# Patient Record
Sex: FEMALE | Race: BLACK OR AFRICAN AMERICAN | NOT HISPANIC OR LATINO | Employment: UNEMPLOYED | URBAN - METROPOLITAN AREA
[De-identification: names, ages, dates, MRNs, and addresses within clinical notes are randomized per-mention and may not be internally consistent; named-entity substitution may affect disease eponyms.]

---

## 2017-06-28 ENCOUNTER — NURSE TRIAGE (OUTPATIENT)
Dept: ADMINISTRATIVE | Facility: CLINIC | Age: 47
End: 2017-06-28

## 2017-06-28 ENCOUNTER — OFFICE VISIT (OUTPATIENT)
Dept: OBSTETRICS AND GYNECOLOGY | Facility: CLINIC | Age: 47
End: 2017-06-28
Payer: MEDICAID

## 2017-06-28 VITALS
SYSTOLIC BLOOD PRESSURE: 118 MMHG | WEIGHT: 155 LBS | RESPIRATION RATE: 13 BRPM | BODY MASS INDEX: 22.19 KG/M2 | HEART RATE: 76 BPM | HEIGHT: 70 IN | DIASTOLIC BLOOD PRESSURE: 76 MMHG

## 2017-06-28 DIAGNOSIS — Z01.419 WELL WOMAN EXAM WITH ROUTINE GYNECOLOGICAL EXAM: Primary | ICD-10-CM

## 2017-06-28 DIAGNOSIS — Z11.3 SCREENING EXAMINATION FOR STD (SEXUALLY TRANSMITTED DISEASE): ICD-10-CM

## 2017-06-28 DIAGNOSIS — Z12.31 ENCOUNTER FOR SCREENING MAMMOGRAM FOR BREAST CANCER: ICD-10-CM

## 2017-06-28 DIAGNOSIS — Z30.41 ORAL CONTRACEPTIVE PILL SURVEILLANCE: ICD-10-CM

## 2017-06-28 DIAGNOSIS — N90.89 VULVAR LESION: ICD-10-CM

## 2017-06-28 DIAGNOSIS — N89.8 VAGINAL ODOR: ICD-10-CM

## 2017-06-28 DIAGNOSIS — Z12.4 CERVICAL CANCER SCREENING: ICD-10-CM

## 2017-06-28 PROCEDURE — 87591 N.GONORRHOEAE DNA AMP PROB: CPT

## 2017-06-28 PROCEDURE — 87480 CANDIDA DNA DIR PROBE: CPT

## 2017-06-28 PROCEDURE — 88304 TISSUE EXAM BY PATHOLOGIST: CPT | Performed by: PATHOLOGY

## 2017-06-28 PROCEDURE — 88175 CYTOPATH C/V AUTO FLUID REDO: CPT

## 2017-06-28 PROCEDURE — 56605 BIOPSY OF VULVA/PERINEUM: CPT | Mod: PBBFAC | Performed by: OBSTETRICS & GYNECOLOGY

## 2017-06-28 PROCEDURE — 99203 OFFICE O/P NEW LOW 30 MIN: CPT | Mod: PBBFAC,25 | Performed by: OBSTETRICS & GYNECOLOGY

## 2017-06-28 PROCEDURE — 99386 PREV VISIT NEW AGE 40-64: CPT | Mod: S$PBB,,, | Performed by: OBSTETRICS & GYNECOLOGY

## 2017-06-28 PROCEDURE — 99999 PR PBB SHADOW E&M-NEW PATIENT-LVL III: CPT | Mod: PBBFAC,,, | Performed by: OBSTETRICS & GYNECOLOGY

## 2017-06-28 PROCEDURE — 56605 BIOPSY OF VULVA/PERINEUM: CPT | Mod: S$PBB,,, | Performed by: OBSTETRICS & GYNECOLOGY

## 2017-06-28 RX ORDER — MELOXICAM 15 MG/1
TABLET ORAL
Refills: 3 | COMMUNITY
Start: 2017-05-18 | End: 2017-08-23

## 2017-06-28 RX ORDER — METRONIDAZOLE 7.5 MG/G
1 GEL VAGINAL NIGHTLY
Qty: 70 G | Refills: 2 | Status: SHIPPED | OUTPATIENT
Start: 2017-06-28 | End: 2017-07-05

## 2017-06-28 NOTE — TELEPHONE ENCOUNTER
Pt had skin tag cut off then silver nitrate today-located in the hair at vaginal area. Noted slight stinging this afternoon after she went to the library and was sitting at computer. Had jeans on so thought it might be irritated. Is concerned about what to do if it gets worse.  Reason for Disposition   Nursing judgment    Protocols used: ST NO GUIDELINE OR REFERENCE [id number]    Advised to get clothes off that are rubbing the area when she gets home. Can try a cold compress to area if the stinging doesn't stop within a reasonable time after the area is no longer being irritated by clothing.

## 2017-06-28 NOTE — PROGRESS NOTES
Subjective:    Patient ID: Aman Vazquez is a 47 y.o. female.     Chief Complaint: Annual Well Woman Exam     History of Present Illness:  Aman presents today for Annual Well Woman exam. She states she is amenorrheic on extended cycle OCPs which she desires to continue..  She notes occasional sensation of warmth in her lower abdomen only at night.  No other menopausal type symptoms. She denies pelvic pain. She denies breast tenderness, masses, nipple discharge. She is due for screening mammogram.  She reports no problems with urination. Bowel movements have not significantly changed. She desires STD screening.  She has occasional vaginal odor.  She also desires removal of vulvar lesion. She is current with health maintenance labs.    History reviewed. No pertinent past medical history.  Past Surgical History:   Procedure Laterality Date    septum correction   2009     Review of patient's allergies indicates:   Allergen Reactions    Penicillins      No current outpatient prescriptions on file prior to visit.     No current facility-administered medications on file prior to visit.      Social History     Social History    Marital status: Single     Spouse name: N/A    Number of children: N/A    Years of education: N/A     Social History Main Topics    Smoking status: Never Smoker    Smokeless tobacco: None    Alcohol use No    Drug use: No    Sexual activity: Not Currently     Other Topics Concern    None     Social History Narrative    None     Family History   Problem Relation Age of Onset    Breast cancer Neg Hx     Colon cancer Neg Hx     Ovarian cancer Neg Hx      The following portions of the patient's history were reviewed and updated as appropriate: allergies, current medications, past family history, past medical history, past social history, past surgical history and problem list.      Menstrual History:   Patient's last menstrual period was 05/03/2017 (exact date)..     OB History       Para Term  AB Living    0 0 0 0 0 0    SAB TAB Ectopic Multiple Live Births    0 0 0 0 0            Review of Systems   Constitutional:        Positive for fatigue   Genitourinary:        Positive for vulvar lesion and vaginal odor   All other systems reviewed and are negative.        Objective:    Vital Signs:  Vitals:    17 1049   BP: 118/76   Pulse: 76   Resp: 13       Physical Exam:  General:  alert; oriented x 4 well-nourished female   Skin:  Skin color, texture, turgor normal. No rashes or lesions   HEENT:  conjunctivae/corneas clear.   Neck: supple, trachea midline   Respiratory:  clear to auscultation bilaterally   Heart:  regular rate and rhythm   Breasts: Symmetrical;  Nipples are protruding and have no nipple discharge. No palpable masses, erythema, skin changes, tenderness, or adenopathy.   Abdomen:  soft, non-tender. Bowel sounds normal. No masses,  no organomegaly   Pelvis: External genitalia: normal general appearance; ~ 0.5 cm raised flesh colored lesion on mons; lesion nontender  Urinary system: urethral meatus normal, bladder nontender  Vaginal: normal mucosa without prolapse or lesions  Cervix: normal appearance; closed   Uterus: upper limits of normal in size; nontender  Adnexa: normal bimanual exam; nontender; no palpable masses   Extremities: Normal ROM; no edema, no cyanosis   Neurologial: Normal strength and tone. No focal numbness or weakness. Reflexes 2+ and equal.   Psychiatric: normal mood, speech, dress, and thought processes     Vulvar lesion prepped with betadine and anesthetized with ~ 3 cc of 1% lidocaine.  Scalpel used to excise lesion.  Lesion sent to pathology. Silver nitrate used to achieve hemostasis.  Pt tolerated well.         Assessment:      1. Well woman exam with routine gynecological exam    2. Cervical cancer screening    3. Oral contraceptive pill surveillance    4. Vaginal odor    5. Vulvar lesion    6. Screening examination for STD (sexually  transmitted disease)    7. Encounter for screening mammogram for breast cancer          Plan:      Well woman exam with routine gynecological exam    Cervical cancer screening  -     Liquid-based pap smear, screening    Oral contraceptive pill surveillance  -     QUASENSE 0.15 mg-30 mcg per tablet; Take 1 tablet by mouth once daily. Do not take placebo pills.  Start next pack.  Dispense: 84 tablet; Refill: 3    Vaginal odor  -     Vaginosis Screen by DNA Probe  -     metronidazole (METROGEL) 0.75 % vaginal gel; Place 1 applicator vaginally nightly.  Dispense: 70 g; Refill: 2    Vulvar lesion  -     Tissue Specimen To Pathology, Obstetrics/Gynecology    Screening examination for STD (sexually transmitted disease)  -     C. trachomatis/N. gonorrhoeae by AMP DNA Cervicovaginal  -     HIV-1 and HIV-2 antibodies; Future; Expected date: 06/28/2017  -     Hepatitis panel, acute; Future; Expected date: 06/28/2017  -     RPR; Future; Expected date: 06/28/2017  -     Herpes simplex type 1 & 2 IgM,Herpes IgM; Future; Expected date: 06/28/2017  -     Herpes simplex type 1&2 IgG,Herpes titer; Future; Expected date: 06/28/2017    Encounter for screening mammogram for breast cancer  -     Mammo Digital Screening Bilat with CAD; Future; Expected date: 06/28/2017        COUNSELING:  Aman was counseled on A.C.O.G. Pap guidelines and recommendations for yearly pelvic exams in addition to recommendations for yearly mammograms and monthly self breast exams. In addition she was counseled to see her PCP for other health maintenance

## 2017-06-29 LAB
C TRACH DNA SPEC QL NAA+PROBE: NOT DETECTED
CANDIDA RRNA VAG QL PROBE: NEGATIVE
G VAGINALIS RRNA GENITAL QL PROBE: NEGATIVE
N GONORRHOEA DNA SPEC QL NAA+PROBE: NOT DETECTED
T VAGINALIS RRNA GENITAL QL PROBE: NEGATIVE

## 2017-07-05 ENCOUNTER — HOSPITAL ENCOUNTER (OUTPATIENT)
Dept: RADIOLOGY | Facility: HOSPITAL | Age: 47
Discharge: HOME | End: 2017-07-05
Attending: OBSTETRICS & GYNECOLOGY
Payer: MEDICAID

## 2017-07-05 VITALS — BODY MASS INDEX: 22.19 KG/M2 | WEIGHT: 155 LBS | HEIGHT: 70 IN

## 2017-07-05 DIAGNOSIS — Z12.31 ENCOUNTER FOR SCREENING MAMMOGRAM FOR BREAST CANCER: ICD-10-CM

## 2017-07-05 PROCEDURE — 77067 SCR MAMMO BI INCL CAD: CPT | Mod: 26,,, | Performed by: RADIOLOGY

## 2017-07-05 PROCEDURE — 77063 BREAST TOMOSYNTHESIS BI: CPT | Mod: 26,,, | Performed by: RADIOLOGY

## 2017-07-05 PROCEDURE — 77067 SCR MAMMO BI INCL CAD: CPT | Mod: TC

## 2017-07-18 ENCOUNTER — TELEPHONE (OUTPATIENT)
Dept: OBSTETRICS AND GYNECOLOGY | Facility: CLINIC | Age: 47
End: 2017-07-18

## 2017-07-18 NOTE — TELEPHONE ENCOUNTER
BAM form received from [facility] requesting pt records. Records faxed to [phone] as requested. Confirmation received. BAM form put to scan.

## 2017-07-25 DIAGNOSIS — R92.2 INCONCLUSIVE MAMMOGRAM: ICD-10-CM

## 2017-07-25 DIAGNOSIS — R92.1 BREAST CALCIFICATION, LEFT: Primary | ICD-10-CM

## 2017-07-26 ENCOUNTER — TELEPHONE (OUTPATIENT)
Dept: OBSTETRICS AND GYNECOLOGY | Facility: CLINIC | Age: 47
End: 2017-07-26

## 2017-07-26 NOTE — TELEPHONE ENCOUNTER
Patient requesting to know if results have come back to determine if she has an active herpes infection. Please advise.

## 2017-08-02 ENCOUNTER — HOSPITAL ENCOUNTER (OUTPATIENT)
Dept: RADIOLOGY | Facility: HOSPITAL | Age: 47
Discharge: HOME | End: 2017-08-02
Attending: OBSTETRICS & GYNECOLOGY
Payer: MEDICAID

## 2017-08-02 DIAGNOSIS — R92.8 ABNORMAL MAMMOGRAM OF LEFT BREAST: Primary | ICD-10-CM

## 2017-08-02 DIAGNOSIS — R92.8 ABNORMAL MAMMOGRAM OF LEFT BREAST: ICD-10-CM

## 2017-08-02 PROCEDURE — 77065 DX MAMMO INCL CAD UNI: CPT | Mod: 26,LT,, | Performed by: RADIOLOGY

## 2017-08-02 PROCEDURE — 77061 BREAST TOMOSYNTHESIS UNI: CPT | Mod: TC,LT

## 2017-08-02 PROCEDURE — 77061 BREAST TOMOSYNTHESIS UNI: CPT | Mod: 26,LT,, | Performed by: RADIOLOGY

## 2017-08-03 ENCOUNTER — TELEPHONE (OUTPATIENT)
Dept: RADIOLOGY | Facility: HOSPITAL | Age: 47
End: 2017-08-03

## 2017-08-03 ENCOUNTER — TELEPHONE (OUTPATIENT)
Dept: OBSTETRICS AND GYNECOLOGY | Facility: CLINIC | Age: 47
End: 2017-08-03

## 2017-08-03 NOTE — TELEPHONE ENCOUNTER
Spoke with patient. Reviewed breast biopsy procedure and reviewed instructions for breast biopsy. Patient expressed understanding and all questions were answered. Provided patient with my phone number to call for any further concerns or questions.   Patient scheduled breast biopsy at the [facility] on 8/17/17

## 2017-08-03 NOTE — TELEPHONE ENCOUNTER
----- Message from Silvia Cervantes MD sent at 8/2/2017  7:11 PM CDT -----  Called and discussed suspicious findings on diagnostic mammogram and recommendation for biopsy.  Pt desires to be seen at [facility].  Instructed her to  copies of films from here prior to appointment.  Pt verbalized understanding.  Consult placed.  Please assist with ensuring patient scheduled for further evaluation and biopsy.

## 2017-08-03 NOTE — TELEPHONE ENCOUNTER
Called patient to schedule breast biopsy. Patient did not answer left message with my phone number to call back to schedule.

## 2017-08-04 ENCOUNTER — PATIENT MESSAGE (OUTPATIENT)
Dept: RADIOLOGY | Facility: HOSPITAL | Age: 47
End: 2017-08-04

## 2017-08-17 ENCOUNTER — HOSPITAL ENCOUNTER (OUTPATIENT)
Dept: RADIOLOGY | Facility: HOSPITAL | Age: 47
Discharge: HOME | End: 2017-08-17
Attending: OBSTETRICS & GYNECOLOGY
Payer: MEDICAID

## 2017-08-17 DIAGNOSIS — R92.8 ABNORMAL MAMMOGRAM: ICD-10-CM

## 2017-08-17 PROCEDURE — 88305 TISSUE EXAM BY PATHOLOGIST: CPT | Performed by: PATHOLOGY

## 2017-08-17 PROCEDURE — 77065 DX MAMMO INCL CAD UNI: CPT | Mod: 26,LT,, | Performed by: RADIOLOGY

## 2017-08-17 PROCEDURE — 77065 DX MAMMO INCL CAD UNI: CPT | Mod: TC,LT

## 2017-08-17 PROCEDURE — 19081 BX BREAST 1ST LESION STRTCTC: CPT | Mod: LT,,, | Performed by: RADIOLOGY

## 2017-08-17 PROCEDURE — A4648 IMPLANTABLE TISSUE MARKER: HCPCS

## 2017-08-17 PROCEDURE — 88360 TUMOR IMMUNOHISTOCHEM/MANUAL: CPT | Mod: 26,,, | Performed by: PATHOLOGY

## 2017-08-19 ENCOUNTER — NURSE TRIAGE (OUTPATIENT)
Dept: ADMINISTRATIVE | Facility: CLINIC | Age: 47
End: 2017-08-19

## 2017-08-19 NOTE — TELEPHONE ENCOUNTER
Reason for Disposition   Caller has medication question, adult has minor symptoms, caller declines triage, and triager answers question    Protocols used: ST MEDICATION QUESTION CALL-A-AH

## 2017-08-22 ENCOUNTER — TELEPHONE (OUTPATIENT)
Dept: HEMATOLOGY/ONCOLOGY | Facility: CLINIC | Age: 47
End: 2017-08-22

## 2017-08-22 NOTE — TELEPHONE ENCOUNTER
Called patient with the results of her breast biopsy. Explained to patient that biopsy showed DCIS, we discussed what this means, and that the next step would be to meet with a breast surgeon to discuss care. Patient asked that I email this information, she will call her friend who is a pharmaceutical rep and ask her when she is free to also be at the appointment. Patient called me back a few minutes later, requested tomorrow at lunchtime. States this is the only date/time that patient's friend can come with her. Scheduled for noon, time ok lori Hoyos. Reviewed location of [facility] with patient, she verbalized understanding of all information

## 2017-08-23 ENCOUNTER — DOCUMENTATION ONLY (OUTPATIENT)
Dept: SURGERY | Facility: CLINIC | Age: 47
End: 2017-08-23

## 2017-08-23 ENCOUNTER — TELEPHONE (OUTPATIENT)
Dept: GENETICS | Facility: CLINIC | Age: 47
End: 2017-08-23

## 2017-08-23 ENCOUNTER — OFFICE VISIT (OUTPATIENT)
Dept: SURGERY | Facility: CLINIC | Age: 47
End: 2017-08-23
Payer: MEDICAID

## 2017-08-23 ENCOUNTER — TELEPHONE (OUTPATIENT)
Dept: OBSTETRICS AND GYNECOLOGY | Facility: CLINIC | Age: 47
End: 2017-08-23

## 2017-08-23 VITALS
HEIGHT: 70 IN | TEMPERATURE: 98 F | SYSTOLIC BLOOD PRESSURE: 137 MMHG | HEART RATE: 75 BPM | DIASTOLIC BLOOD PRESSURE: 68 MMHG | WEIGHT: 154.19 LBS | BODY MASS INDEX: 22.07 KG/M2

## 2017-08-23 DIAGNOSIS — D05.12 DUCTAL CARCINOMA IN SITU (DCIS) OF LEFT BREAST: Primary | ICD-10-CM

## 2017-08-23 DIAGNOSIS — L29.2 VULVAR ITCHING: Primary | ICD-10-CM

## 2017-08-23 PROCEDURE — 99213 OFFICE O/P EST LOW 20 MIN: CPT | Mod: PBBFAC,PO | Performed by: SURGERY

## 2017-08-23 PROCEDURE — 3008F BODY MASS INDEX DOCD: CPT | Mod: ,,, | Performed by: SURGERY

## 2017-08-23 PROCEDURE — 99205 OFFICE O/P NEW HI 60 MIN: CPT | Mod: S$PBB,,, | Performed by: SURGERY

## 2017-08-23 PROCEDURE — 99999 PR PBB SHADOW E&M-EST. PATIENT-LVL III: CPT | Mod: PBBFAC,,, | Performed by: SURGERY

## 2017-08-23 RX ORDER — CLOTRIMAZOLE AND BETAMETHASONE DIPROPIONATE 10; .64 MG/G; MG/G
CREAM TOPICAL 2 TIMES DAILY
Qty: 45 G | Refills: 1 | Status: SHIPPED | OUTPATIENT
Start: 2017-08-23 | End: 2017-09-22

## 2017-08-23 RX ORDER — OMEPRAZOLE 40 MG/1
40 CAPSULE, DELAYED RELEASE ORAL DAILY
Refills: 2 | COMMUNITY
Start: 2017-08-19

## 2017-08-23 NOTE — PROGRESS NOTES
New Breast Cancer  History and Physical  [facility]  Department of Surgery    REFERRING PROVIDER: Silvia Cervantes MD  [address] [address]    CHIEF COMPLAINT: left breast DCIS    Subjective:      Aman Vazquez is a 47 y.o. premenopausal female referred for evaluation of recently diagnosed DCIS of the left breast. The patient was initially referred for surgical evaluation of an abnormal mammogram first noted 17. Follow-up mammogram (17) and showed left breast calcifications, BI-RADs 4. A stereotactic biopsy was performed on 17 with pathology revealing low grade ductal carcinoma in-situ of the breast.     Patient does routinely do self breast exams.  Patient has not noted a change on breast exam.  Patient denies nipple discharge. Patient admits to multiple abnormal mammograms in the past due to dense breast tissue, with one previous breast biopsy in  which was benign. She thinks it might have  been on her left side but she's not sure. Patient denies a personal history of breast cancer.    Findings at that time were the following:   Tumor size:  calcifications in upper inner portion of breast, 9 cmfn  Tumor grade:  Low  Estrogen Receptor: +   Progesterone Receptor: +   Her-2 serjio: n/a   Lymph node status: n/a  Lymphatic invasion: n/a     GYN History:  Age of menarche was 15.  Last menstrual period was 5/3/17, due to her OCP. Patient denies hormonal therapy, but as been on OCPs for 10+ years. Patient is .    FAMILY History:  No family history of breast or ovarian cancer.    No past medical history on file.  Past Surgical History:   Procedure Laterality Date    BREAST BIOPSY      , unsure side    septum correction        Current Outpatient Prescriptions on File Prior to Visit   Medication Sig Dispense Refill    QUASENSE 0.15 mg-30 mcg per tablet Take 1 tablet by mouth once daily. Do not take placebo pills.  Start next pack. 84 tablet 3    [DISCONTINUED] meloxicam  "(MOBIC) 15 MG tablet TK 1 T PO QD  3     No current facility-administered medications on file prior to visit.      Social History     Social History    Marital status: Single     Spouse name: N/A    Number of children: N/A    Years of education: N/A     Occupational History    Not on file.     Social History Main Topics    Smoking status: Never Smoker    Smokeless tobacco: Not on file    Alcohol use No    Drug use: No    Sexual activity: Not Currently     Other Topics Concern    Not on file     Social History Narrative    No narrative on file     Family History   Problem Relation Age of Onset    Breast cancer Neg Hx     Colon cancer Neg Hx     Ovarian cancer Neg Hx         Review of Systems  Review of Systems   Constitutional: Negative for chills, fever and unexpected weight change.   HENT: Negative for sore throat and trouble swallowing.    Eyes: Negative for visual disturbance.   Respiratory: Negative for shortness of breath.    Cardiovascular: Negative for chest pain and leg swelling.   Gastrointestinal: Negative for abdominal distention, abdominal pain, constipation, diarrhea, nausea and vomiting.   Genitourinary: Negative for dysuria and urgency.   Musculoskeletal: Negative for back pain.   Skin: Negative for color change and rash.   Allergic/Immunologic: Negative for immunocompromised state.   Neurological: Negative for weakness and headaches.   Hematological: Negative for adenopathy.        Objective:   PHYSICAL EXAM:  /68 (BP Location: Right arm, Patient Position: Sitting, BP Method: Medium (Automatic))   Pulse 75   Temp 98.1 °F (36.7 °C) (Oral)   Ht 5' 10" (1.778 m)   Wt 70 kg (154 lb 3.4 oz)   BMI 22.13 kg/m²     Physical Exam   Constitutional: She is oriented to person, place, and time. She appears well-nourished. No distress.   HENT:   Head: Normocephalic and atraumatic.   Eyes: EOM are normal. Pupils are equal, round, and reactive to light.   Neck: Normal range of motion. Neck " supple.   Cardiovascular: Normal rate, regular rhythm, normal heart sounds and intact distal pulses.    Pulmonary/Chest: Effort normal. No respiratory distress. Right breast exhibits no inverted nipple, no mass, no nipple discharge, no skin change and no tenderness. Left breast exhibits skin change. Left breast exhibits no inverted nipple, no mass, no nipple discharge and no tenderness.   Abdominal: Soft. There is no tenderness.   Musculoskeletal: Normal range of motion.   Lymphadenopathy:     She has no cervical adenopathy.   Neurological: She is alert and oriented to person, place, and time.   Skin: Skin is warm and dry. No erythema.           Radiology review: Images personally reviewed by me in the clinic.   Mammogram (8/2/17):  This procedure was performed using tomosynthesis.  Computer-aided detection was utilized in the interpretation of this examination.  The breast is extremely dense, which lowers the sensitivity of mammography.     There are fine pleomorphic calcifications in a linear distribution seen in the upper inner quadrant of the left breast in the posterior portion, 9 cm from the nipple.      Impression:  Left  Calcifications: Left breast calcifications at the upper inner posterior position. Assessment: 4 - Suspicious finding. Biopsy is recommended.      BI-RADS Category:   Left: 4 - Suspicious  Overall: 4 - Suspicious      PATH:  Supplemental Diagnosis  HORMONE RECEPTOR RESULTS (performed with appropriate controls):  ER - POSITIVE (MODERATE, 80% OF THE TUMOR NUCLEI)  KS - POSITIVE (MODERATE, 60% OF THE TUMOR NUCLEI)  ER,PGR    FINAL PATHOLOGIC DIAGNOSIS  1. LEFT BREAST, BIOPSY WITH CALCIFICATION:  Ductal carcinoma in-situ, low-grade, cribriform with calcifications  2. LEFT BREAST, BIOPSY WITHOUT CALCIFICATIONS:  Being breast parenchyma with stromal fibrosis/hyalinization  No evidence of neoplasia or malignancy    Assessment:      Aman Vazquez is a 47 y.o. premenopausal female with recently  diagnosed DCIS of the left breast.      Plan:    Options for management were discussed with the patient and her family. We reviewed the existing data noting the equivalency of breast conserving surgery with radiation therapy and mastectomy. We also reviewed the guidelines of the National Comprehensive Cancer Network for Stage 0 breast carcinoma. We discussed the need for lumpectomy margins to be negative for carcinoma, the necessity for postoperative radiation therapy after breast conservation in most cases, the possibility of a failed or false negative sentinel lymph node biopsy and the potential need for complete lymphadenectomy for a failed or positive sentinel lymph node biopsy were fully discussed. In the setting of mastectomy, delayed or immediate reconstruction options are available and were discussed.     In the setting of lumpectomy, radiation therapy would be recommended majority of the time.  The duration and treatment side effects were discussed with the patient.  This will coordinated with the radiation oncologist pending final pathology.    We also discussed the role of systemic therapy in the treatment of early stage breast cancer.  We discussed that this is based on tumor biology and chema status and will be determined based on final pathology.  We discussed that if the cancer is hormone positive, endocrine therapy would be recommended in most cases and its use can reduce the risk of recurrence as well as improve survival. Side effects of treatment were briefly discussed. We also discussed the potential role for chemotherapy based on a number of factors such as tumor phenotype (ER+ vs. triple negative vs. [id number]+) and this would be determined in coordination with the medical oncologist.    The patient would like to discuss her options further with her family. In the meantime, she will meet with a genetic counselor and a radiation oncologist to discuss her risk of future breast cancer, and possible  side effects of her radiation treatments. She will follow up with us once she has met with these people and made a decision about her surgery.  She is very undecided about what surgery she may want.  She is likely to obtain a second opinion as well.  I encouraged her to do so.    Patient was educated on breast cancer, receptors, wire localization lumpectomy, mastectomy, sentinel lymph node mapping and biopsy, axillary lymph node dissection, reconstruction, breast prosthesis with post-mastectomy bra and radiation therapy. Patient was given patient information binder including [facility] breast cancer treatment brochure.  All her questions were answered.    Total time spent with the patient: 60 minutes.  45 minutes of face to face consultation and 15 minutes of chart review and coordination of care.

## 2017-08-23 NOTE — TELEPHONE ENCOUNTER
Called and left message for patient. Please call again in am and advise patient that symptoms sound as though she is simply healing.  However, lotrisone ointment sent to her pharmacy which may help her symptoms.

## 2017-08-23 NOTE — PROGRESS NOTES
Nurse Navigator Note:     Met with patient during her consult with Dr. Hoyos. Patient and I reviewed the information she discussed with Dr. Hoyos, including treatment options, diagnosis, and future plans for workup. Patient and I went through the new patient binder, explained some of the information and why it is provided.     Patient was given Dr. Hoyos's card and my card. Encouraged her to call me if she has any questions or concerns or would like to schedule any additional appointments. Verbalized understanding of all information.

## 2017-08-23 NOTE — TELEPHONE ENCOUNTER
----- Message from Marissa Moss MA sent at 8/23/2017  3:21 PM CDT -----  Contact: self  Aman Vazquez  MRN: [id number]  Home Phone      [phone]  Work Phone      Not on file.  Mobile          [phone]    Patient Care Team:  Riley Leiva MD as PCP - General (Internal Medicine)  Silvia Cervantes MD as Obstetrician (Obstetrics)  OB? No  What phone number can you be reached at?  [phone]  Message:   Needs to discuss skin tag that Dr Cervantes removed about a month or so ago.  Stated area is not starting to itch.  Also needs to discuss spotting that she is having for longer than normal.

## 2017-08-23 NOTE — TELEPHONE ENCOUNTER
Spoke with pt and scheduled an appt for pt on 8/29 at 10am per Cecelia. Pt verbalized understanding.

## 2017-08-23 NOTE — TELEPHONE ENCOUNTER
----- Message from Cecelia Lea MS sent at 8/23/2017  1:54 PM CDT -----  Regarding: FW: new breast cancer, would like sooner appt  Please offer next Tuesday.   ----- Message -----  From: Josiane Brooks RN  Sent: 8/23/2017   1:32 PM  To: Cecelia Lea MS  Subject: new breast cancer, would like sooner appt        Joshua Rai,     We have a new DCIS that was hoping to see someone for genetic testing in the near future. Yulia's not available until Sept 13th. Can you accomodate her sooner? Thanks in advance!

## 2017-08-23 NOTE — LETTER
August 23, 2017      Silvia Cervantes MD  [address] [address]           [facility]  [address]  [facility] [address]  Phone: [phone]  Fax: [phone]          Patient: Aman Vazquez   MR Number: [id number]   YOB: 1970   Date of Visit: 8/23/2017       Dear Dr. Silvia Cervantes:    Thank you for referring Aman Vazquez to me for evaluation. Attached you will find relevant portions of my assessment and plan of care.    If you have questions, please do not hesitate to call me. I look forward to following Aman Vazquez along with you.    Sincerely,    Casandra Hoyos MD  Enclosure  CC:  No Recipients    If you would like to receive this communication electronically, please contact [email or web address] or [phone] to request more information on [employer] Link access.    For providers and/or their staff who would like to refer a patient to [facility], please contact us through our one-stop-shop provider referral line, [facility], at [phone].    If you feel you have received this communication in error or would no longer like to receive these types of communications, please e-mail [email or web address]

## 2017-08-23 NOTE — TELEPHONE ENCOUNTER
Patient states she had a skin tag removed at this office one month ago. States area is still itchy and burns periodically. Requesting to know if or when this will subside. Denies any other symptoms. Please advise.

## 2017-08-29 ENCOUNTER — OFFICE VISIT (OUTPATIENT)
Dept: GENETICS | Facility: CLINIC | Age: 47
End: 2017-08-29
Payer: MEDICAID

## 2017-08-29 ENCOUNTER — LAB VISIT (OUTPATIENT)
Dept: LAB | Facility: HOSPITAL | Age: 47
End: 2017-08-29
Attending: MEDICAL GENETICS
Payer: MEDICAID

## 2017-08-29 ENCOUNTER — INITIAL CONSULT (OUTPATIENT)
Dept: RADIATION ONCOLOGY | Facility: CLINIC | Age: 47
End: 2017-08-29
Payer: MEDICAID

## 2017-08-29 VITALS — WEIGHT: 154.31 LBS | HEIGHT: 70 IN | BODY MASS INDEX: 22.09 KG/M2

## 2017-08-29 VITALS
SYSTOLIC BLOOD PRESSURE: 120 MMHG | WEIGHT: 154.31 LBS | TEMPERATURE: 98 F | BODY MASS INDEX: 21.6 KG/M2 | HEIGHT: 71 IN | RESPIRATION RATE: 16 BRPM | DIASTOLIC BLOOD PRESSURE: 67 MMHG | HEART RATE: 68 BPM

## 2017-08-29 DIAGNOSIS — D05.12 DUCTAL CARCINOMA IN SITU (DCIS) OF LEFT BREAST: ICD-10-CM

## 2017-08-29 DIAGNOSIS — Z80.3 FAMILY HISTORY OF BREAST CANCER: ICD-10-CM

## 2017-08-29 PROCEDURE — 36415 COLL VENOUS BLD VENIPUNCTURE: CPT | Mod: PO

## 2017-08-29 PROCEDURE — 99999 PR PBB SHADOW E&M-EST. PATIENT-LVL III: CPT | Mod: PBBFAC,,, | Performed by: RADIOLOGY

## 2017-08-29 PROCEDURE — 99212 OFFICE O/P EST SF 10 MIN: CPT | Mod: PBBFAC,27,PO | Performed by: GENETIC COUNSELOR, MS

## 2017-08-29 PROCEDURE — 99213 OFFICE O/P EST LOW 20 MIN: CPT | Mod: PBBFAC | Performed by: RADIOLOGY

## 2017-08-29 PROCEDURE — 99499 UNLISTED E&M SERVICE: CPT | Mod: S$PBB,,, | Performed by: GENETIC COUNSELOR, MS

## 2017-08-29 PROCEDURE — 99999 PR PBB SHADOW E&M-EST. PATIENT-LVL II: CPT | Mod: PBBFAC,,, | Performed by: GENETIC COUNSELOR, MS

## 2017-08-29 PROCEDURE — 99204 OFFICE O/P NEW MOD 45 MIN: CPT | Mod: S$PBB,,, | Performed by: RADIOLOGY

## 2017-08-29 PROCEDURE — 96040 PR GENETIC COUNSELING, EACH 30 MIN: CPT | Mod: ,,, | Performed by: GENETIC COUNSELOR, MS

## 2017-08-29 PROCEDURE — 3008F BODY MASS INDEX DOCD: CPT | Mod: ,,, | Performed by: RADIOLOGY

## 2017-08-29 NOTE — LETTER
August 30, 2017      Casandra Hoyos MD  [address]  [facility] [address]           [facility]  [address]  [facility] [address]  Phone: [phone]          Patient: Aman Vazquez   MR Number: [id number]   YOB: 1970   Date of Visit: 8/29/2017       Dear Dr. Casandra Hoyos:    Thank you for referring Aman Vazquez to me for evaluation. Attached you will find relevant portions of my assessment and plan of care.    If you have questions, please do not hesitate to call me. I look forward to following Aman Vazquez along with you.    Sincerely,    Cecelia Lea, MS    Enclosure  CC:  No Recipients    If you would like to receive this communication electronically, please contact [email or web address] or [phone] to request more information on [employer] Link access.    For providers and/or their staff who would like to refer a patient to [facility], please contact us through our one-stop-shop provider referral line, [facility], at [phone].    If you feel you have received this communication in error or would no longer like to receive these types of communications, please e-mail [email or web address]

## 2017-08-29 NOTE — PROGRESS NOTES
REFERRING PHYSICIAN: Casandra Hoyos M.D    DIAGNOSIS: DCIS of the left breast, status post biopsy    HISTORY OF PRESENT ILLNESS:  Ms. Vazquez is a 47-year-old female who was recently diagnosed with DCIS of the left breast after evaluation for an abnormal mammogram in 2017.  This revealed suspicious calcifications in the upper inner quadrant of the left breast in the posterior portion.  A core needle biopsy on 2017 revealed low grade DCIS, which was ER positive (80%) and DC positive (60%).  After evaluation by Dr. Mello, she is recommended to undergo breast conservation with lumpectomy followed by radiation or mastectomy.  She is here today for discussion regarding that.    At present, she reports mild discomfort at the biopsy site in the left breast.  She has history of heterogeneously dense breasts bilaterally.  She denies left breast edema, skin erythema, or nipple discharge.  She also denies fever, night sweats, or weight loss.  Of note, she went to the ER yesterday with left wrist pain and was noted to have a scaphoid fracture.  She is planning to see an orthopedist for follow-up for that.    REVIEW OF SYSTEMS:   As above.  In addition, patient denies headaches, visual problems, dizziness, chest pain, shortness of breath, cough, nausea, vomiting, diarrhea, or any new bony pains.  Patient also denies easy bruising, skin rashes, or numbness or tingling.    GYN HISTORY:  Menarche at age 15.  Last menstrual period was May 2017.  She denies hormone replacement therapy.      ECO    PAST MEDICAL HISTORY:  Past Medical History:   Diagnosis Date    GERD (gastroesophageal reflux disease)        PAST SURGICAL HISTORY:  Past Surgical History:   Procedure Laterality Date    BREAST BIOPSY      , unsure side    septum correction   2009       ALLERGIES:   Review of patient's allergies indicates:   Allergen Reactions    Penicillins        MEDICATIONS:  Current Outpatient Prescriptions   Medication  Sig    clotrimazole-betamethasone 1-0.05% (LOTRISONE) cream Apply topically 2 (two) times daily.    omeprazole (PRILOSEC) 40 MG capsule Take 40 mg by mouth once daily.    QUASENSE 0.15 mg-30 mcg per tablet Take 1 tablet by mouth once daily. Do not take placebo pills.  Start next pack.    hydrocodone-acetaminophen 5-325mg (NORCO) 5-325 mg per tablet Take 1 tablet by mouth every 4 (four) hours as needed for Pain.     No current facility-administered medications for this visit.        SOCIAL HISTORY:  Social History     Social History    Marital status: Single     Spouse name: N/A    Number of children: N/A    Years of education: N/A     Occupational History    Not on file.     Social History Main Topics    Smoking status: Never Smoker    Smokeless tobacco: Never Used    Alcohol use No    Drug use: No    Sexual activity: Not Currently     Other Topics Concern    Not on file     Social History Narrative    No narrative on file       FAMILY HISTORY:  Family History   Problem Relation Age of Onset    Breast cancer Paternal Grandmother     Cancer Paternal Cousin     Colon cancer Neg Hx     Ovarian cancer Neg Hx          PHYSICAL EXAMINATION:  Vitals:    08/29/17 1305   BP: 120/67   Pulse: 68   Resp: 16   Temp: 98.4 °F (36.9 °C)     GENERAL: Patient is alert and oriented, in no acute distress.  HEENT:Extraocular muscles are intact.  Oropharynx is clear without lesions.  There is no cervical or supraclavicular lymphadenopathy palpated.  No thyromegaly noted.  HEART: Regular rate and rhythm.  LUNGS: Clear to auscultation bilaterally.  BREAST EXAM: The left breast is larger than the right.  Bilateral breasts are heterogeneously dense.  Biopsy site is noted in the upper inner quadrant of the left breast with small seroma.  No palpable left axillary lymphadenopathy.  The right breast and right axilla are also without suspicious lesions.  The nipple is inverted on the right side.  ABDOMEN:Soft, nontender,  nondistended, without hepatosplenomegaly.  Normoactive bowel sounds.  EXTREMITIES: No clubbing, cyanosis, or edema.  NEUROLOGICAL: Cranial nerve II through XII grossly intact.  Sensation is intact.  Strength is 5 out of 5 in the upper and lower extremities bilaterally.       ASSESSMENT:   This is a 47-year-old female with biopsy-proven low grade DCIS of the left breast with an ER/NV positive lesion.    PLAN:   After review of the pathology and images of the mammogram, patient is noted to have low grade DCIS of the left breast after biopsy.  I had a long discussion with the patient regarding the next step of treatment which includes lumpectomy and postoperative radiation or mastectomy.  I also discussed the indications for postoperative radiation based on final pathology which may include partial breast radiation or whole breast radiation.  Given her age, I would recommend whole breast irradiation if warranted.  The risks, benefits, and side effects of all of these options were discussed in detail with the patient.  All of her questions were answered.  She has not decided about surgery yet and plans to contact Dr. Hoyos's office after she makes a final decision.  She would also like to get a second opinion prior to that.  I plan to follow-up with the results of the lumpectomy and see her back for final recommendation after the pathology is available.    Psychosocial Distress screening score of Distress Score: 5 noted and reviewed. No intervention indicated.    I spent approximately 60 minutes reviewing the available records and evaluating the patient, out of which over 50% of the time was spent face to face with the patient in counseling and coordinating this patient's care.

## 2017-08-29 NOTE — LETTER
August 29, 2017      Casandra Hoyos MD  [address]  [facility] [address]           [facility]  [address]  [facility] [address]  Phone: [phone]          Patient: Aman Vazquez   MR Number: [id number]   YOB: 1970   Date of Visit: 8/29/2017       Dear Dr. Casandra Hoyos:    Thank you for referring Aman Vazquez to me for evaluation. Attached you will find relevant portions of my assessment and plan of care.    If you have questions, please do not hesitate to call me. I look forward to following Aman Vazquez along with you.    Sincerely,    Fallon Cuello MD    Enclosure  CC:  No Recipients    If you would like to receive this communication electronically, please contact [email or web address] or [phone] to request more information on [employer] access.    For providers and/or their staff who would like to refer a patient to [facility], please contact us through our one-stop-shop provider referral line, [facility], at [phone].    If you feel you have received this communication in error or would no longer like to receive these types of communications, please e-mail [email or web address]

## 2017-08-29 NOTE — PATIENT INSTRUCTIONS
"  Radiation Therapy Treatment  Radiation therapy can help you in your fight against cancer. It begins with a consultation with your doctor to discuss a treatment strategy. If radiation is indicated you will then return for a "simulation." The simulation is a planning session that helps the doctor target your cancer and design a radiation plan to protect normal tissues. After the simulation and plan are completed (anywhere from 1 to 7 days you will return for another verification session and then begin your daily treatments. Treatment is usually once daily Monday to Friday and takes less than a half an hour. Sometimes radiation is recommended twice a day usually 4 to 6 hours between treatments. After the course of radiation is complete you will be scheduled to return for follow up appointments to make sure the cancer is under control and any side effects that may have occurred during treatment are taken care of.  Radiation therapy uses high-energy X-rays to kill cancer cells.   Your treatment planning visit --the simulation  Your radiation therapy team uses a special machine called a simulator to map out your treatment. The simulator is usually an X-ray machine (flouroscopy) or CT scanner (computerized tomography) or MRI scanner or PET-CT scanner machine. Laser lights act as guides to help position your body accurately. During this visit:  · The best position for your body is determined. Notes are made in your chart so youll be placed the same way each time.  · Special devices may be used to keep your body correctly positioned and still during treatment. These may include molds, masks, rests, and blocks.  · Ink marks are made on your skin over the spot to be treated. Tiny permanent tattoos may also be used. The marks act as a target for the treatment to stay at the exact same place each time.  · Markers, such as metal balls or wires, may be placed on or in your body. Sometime these are taped to this skin to help " with the imaging process. These work with the X-rays to position your body. The markers are removed when the visit is over.  After the imaging and data are acquired the information is sent into the computer planning system for your doctor and their team of physicists, dosimetrists, to design a treatment field. The field will best target your cancer and its routes of spread while helping limit radiation to normal tissues nearby.  Your treatments  Each treatment usually takes 10 to 30 minutes. You may need to change into a hospital gown. The radiation therapist positions you on the treatment table, then leaves the room. Sometimes before each treatment delivery imaging will be obtained on the machine. The machine may take digital X-rays or a CT scan to help align you. During treatment, lie as still as you can and breathe normally. You will hear noises coming from the machine. You can talk to the radiation therapist, who watches you from the control room on a TV monitor. After treatment, the therapist will help you off the table. You can then get dressed and go back to your normal activities.  Date Last Reviewed: 1/14/2016  © 8941-5820 [employer]. [address] [place] [address]. All rights reserved. This information is not intended as a substitute for professional medical care. Always follow your healthcare professional's instructions.        Discharge Instructions for Radiation Therapy  Radiation therapy uses high-energy X-rays to kill cancer cells and help you in your fight against cancer. Radiation destroys cancer cells gradually, over time. The goal of therapy is to focus on and kill as many cancer cells as possible. Radiation can also damage or kill some of the normal cells that are closest to the tumor. Damaged normal cells can repair themselves, often within a few days.  Caring for your skin  You should ask your healthcare provider for specific products that he or she recommends for  "washing and bathing. In general, use a mild nondetergent soap and warm (not hot) water to clean the area receiving radiation. Pat the region dry rather than rubbing.  Your healthcare provider may give you products to moisturize the skin and prevent infection. The goal is to prevent cracks or breaks in the skin that may be sensitive from treatment:   · Dont be surprised if your treatment causes skin redness, and a type of "sunburn" over time. Some radiation treatments can cause this.   · Ask your therapy team what lotion to use. Also ask for directions about when and how to apply it.  · Avoid prolonged or direct sunlight on the treated area. Ask your therapy team about using a sunscreen. You do not have to avoid going outside altogether, but must take appropriate precautions.  · Dont remove ink marks unless your radiation therapist says its OK. Dont scrub or use soap on the marks when you wash. Let water run over them and pat them dry.  · Protect your skin from heat or cold. Avoid hot tubs, saunas, heating pads, or ice packs.  · Avoid clothing that causes friction or rubbing on the skin.  Fighting fatigue  Radiation therapy may cause you to feel tired. Your body is working hard to heal and repair itself. To feel better, try these things:  · Do light exercise each day. Take short walks.  · Plan tasks for the times when you tend to have the most energy. Ask for help when you need it.  · Relax before you go to bed. This will help you sleep better. Try reading or listening to soothing music.  Coping with appetite changes  Here are ways to cope:  · Tell your therapy team if you find it hard to eat or you have no appetite. You may be referred to a nutritionist to help you with meal planning.  · Radiation to certain internal sites can cause nausea, depending on the location of treatment. This can affect your appetite. Think of healthy eating as part of your treatment. Try these tips:  ¨ Eat slowly.  ¨ Eat small meals " several times a day.  ¨ Eat more food when youre feeling better.  ¨ Ask others to keep you company when you eat.  ¨ Stock up on easy-to-prepare foods.  ¨ Eat foods high in protein and calories. Your healthcare provider may recommend liquid meal supplements.  ¨ Drink plenty of water and other fluids.  ¨ Ask your healthcare provider before taking any vitamins or over-the-counter supplements. Such products are not regulated by the FDA and can sometimes interfere with your treatments.   Dealing with other side effects  Here are suggestions to deal with other side effects:   · Be prepared for hair loss in the area being treated. The hair loss may be permanent. Be sure to discuss this with your healthcare provider.  · Sip cool water if your mouth or throat becomes dry or sore. Ice chips may also help.  · Tell your healthcare provider if you have diarrhea or constipation. You may be given a special diet.  · If you have trouble swallowing liquids, tell your healthcare provider.  Follow-up  Make a follow-up appointment as directed by your healthcare provider.     When to call your healthcare provider  Call your healthcare provider right away if you have any of the following:  · Unexpected headaches  · Trouble concentrating  · Ongoing fatigue  · Wheezing, shortness of breath, or trouble breathing  · Pain that doesnt go away, especially if its always in the same place  · New or unusual lumps, bumps, or swelling  · Dizziness or lightheadedness  · Unusual rashes, bruises, or bleeding  · Fever of 100.4°F (38°C) or higher, or chills  · Nausea and vomiting  · Diarrhea that doesnt improve with time  · Skin breakdown; significant pain due to skin irritation   Date Last Reviewed: 1/13/2016  © 0083-2275 [employer]. [address] [place] [address], [place] [address]. All rights reserved. This information is not intended as a substitute for professional medical care. Always follow your healthcare professional's  instructions.        Radiation Therapy: Managing Short-Term Side Effects     Take short walks daily.     Radiation therapy uses high-energy X-rays or particles to kill cancer cells. Some normal cells can also be affected and result in side effects, such as dry skin, fatigue, or appetite changes. Most side effects heal when your radiation therapy is over.  Having side effects of radiation therapy does not mean that your cancer is getting worse or that therapy isnt working.   Caring for your skin  Skin reactions may occur where your body receives radiation. Your skin may become dry, itchy, red, and peeling. It may darken in that spot, like a tan. To care for your skin:  · Dont scrub or use soap on the treatment area.  · Ask your therapy team what lotion to use.  · Avoid sun on the treated area. Ask your team about using a sunscreen.  · Do not remove ink marks unless your radiation therapist says you can. Dont scrub or use soap on the marks when you wash. Let water run over them and pat them dry.  · Protect your skin from heat or cold. Avoid hot tubs, saunas, hot pads, and ice packs.  · Wear soft, loose clothing to avoid rubbing skin.  Fighting fatigue  The cancer itself or the radiation therapy may cause you to feel tired. Your body is working hard to heal and repair itself. To feel better:  · Try light exercise each day. Take short walks.  · Plan tasks for the times when you tend to have the most energy. Ask for help when you need it.  · Relax before you go to bed to sleep better. Try reading or listening to soothing music.  · Be sure to let your cancer care team know if you continue to have fatigue that is not getting better. They may be able to offer ways to help.   Coping with appetite changes  Tell your therapy team if you find it hard to eat or have no appetite. You may be referred to a nutritionist, a specialist in meal planning. To keep your strength up, you need to eat well and maintain your weight. Think  of healthy eating as part of your treatment. Try these tips:  · Eat slowly.  · Eat small meals several times a day.  · Eat more food when youre feeling better, even if it is not mealtime.  · Ask others to keep you company when you eat.  · Stock up on easy-to-prepare foods.  · Eat foods high in protein and calories.  · Drink plenty of water and other fluids.  · Ask your healthcare provider before taking any vitamins.  Site-specific side effects  These side effects include the following:   · Hair loss may happen in the area being treated. The hair often grows back after treatment.  · Your mouth or throat can become dry or sore if the head or neck is being treated. Sip cool water to help ease discomfort.  · Nausea and bowel changes can happen with radiation to the pelvic region. Tell your healthcare provider if you have nausea, diarrhea or constipation. You may be given medicine or told to follow a special diet.  Talk to your healthcare team  Radiation therapy can also have other side effects, including some that might not show up until years later. Be sure to talk to your healthcare team about what to expect with the type of radiation therapy you are getting, including when you should call them with concerns.   Date Last Reviewed: 5/1/2016  © 8981-7452 [employer]. [address] [place] [address], [place] [address]. All rights reserved. This information is not intended as a substitute for professional medical care. Always follow your healthcare professional's instructions.    Return after lumpectomy for final recommendation regarding radiation

## 2017-08-30 NOTE — PROGRESS NOTES
Aman Vazquez   DOS: 17  : 74  MRN: [id number]      REFERING MD: Casandra Hoyos MD    REASON FOR CONSULT: Our Medical Genetics Service was asked to evaluate this 47-year-old  female who was recently diagnosed with left breast cancer. Ms. Vazquez is considering genetic testing to determine cancer treatment and screening protocol.       HISTORY OF PRESENT ILLNESS: Ms. Vazquez has a history of dense breasts and started screening mammograms at the age of 37. She repots a history of a breast biopsy performed in . The patient presented for a mammogram on 17 which identified left breast calcifications.  A biopsy was performed and showed ER+/KY+ DCIS. Ms. Vazquez is seeking a second opinion at [facility].        Ms. Vazquez reported that menarche was at age 15.  She has no children.  She is premenopausal with about a 10 year history of OCP use. The patient has a history of GI issues. She had a UGI in  which was normal. She had colonoscopy about 2 weeks which did not identify any polyps per report.       FAMILY HISTORY: At this visit, a 3 generation pedigree was constructed and will be scanned in the patients chart. Ms. Vazquez has a paternal grandmother who had breast cancer diagnosed after the age of 50. On the paternal side, there is a first cousin who was diagnosed with leukemia as an infant. The remainder of the family history was noncontributory. Paternal and maternal ancestry is .       IMPRESSION: We reviewed the patients medical and family history. We discussed the genetics of cancer and cancer risks associated with a hereditary predisposition to cancer. The benefits, risks, and limitations of genetic testing were discussed in detail.  The patient was counseled about the multifactorial nature of breast cancer.  The patients personal history of early onset breast cancer raises the possibility of hereditary susceptibility to breast and ovarian cancer syndrome  (HBOC). The two genes most strongly associated with early onset breast cancer are BRCA1 and BRCA2.     Women who carry mutations in the BRCA genes have a 56%-87% risk to develop breast cancer and up to a 27%-44% risk of developing ovarian cancer during their lifetime. Women who carry a BRCA gene mutation also have a greater chance of developing a second primary breast cancer and ovarian cancer following breast cancer. Men who carry a BRCA gene mutation have up to a 6% risk to develop breast cancer and an increased risk for early-onset prostate cancer (diagnosed under age 60). Mutations in the BRCA2 gene have also been associated with an increased risk other types of cancer in both men and women in some families, most notably pancreatic cancer and melanoma.     There are several other genes that may also account for a substantial proportion of hereditary breast cancer cases (KRISTEN, CDH1, CHEK2, PALB2, PTEN and TP53 in addition to BRCA1 / BRCA2). The National Comprehensive Cancer Network has outlined increased breast screening for every gene on this panel. If a mutation is found in any one of these genes, we can also offer targeted mutation testing to the patients relatives. The risks of identifying a variant of unknown clinical significance were reviewed with the patient. We reviewed that a negative result certainly does not rule out a hereditary predisposition to cancer. We have discussed the insurance preauthorization process and turnaround time for the genetic testing. Ms. Vazquez expressed understanding of the information discussed and has opted to purse testing at this time.         RECOMMENDATIONS:   1. Insurance pre-authorization  2. Breast Cancer High/Moderate Risk Panel (KRISTEN, BRCA1, BRCA2, CDH1, CHEK2, PALB2, PTEN, TP53)  3. If positive, advise the patient for appropriate healthcare management.  4. If positive, offer testing to appropriate relatives.  5. Follow-up when results are complete.        Time spent:  60 minutes, more than 50% was spent in counseling. The note is in epic.             Erin Macedo M.D.                                                               Cecelia Lea MS  Section Head - Medical Genetics                                                  Genetic Counselor           [facility]                                                                                       [email or web address]

## 2017-09-08 LAB
MISCELLANEOUS TEST NAME: NORMAL
REFERENCE LAB: NORMAL
SPECIMEN TYPE: NORMAL
TEST RESULT: NORMAL

## 2017-09-11 ENCOUNTER — TELEPHONE (OUTPATIENT)
Dept: GENETICS | Facility: CLINIC | Age: 47
End: 2017-09-11

## 2017-09-11 NOTE — TELEPHONE ENCOUNTER
Breast High/Moderate Risk Panel( KRISTEN, BRCA1, BRCA2, CDH1, CHEK2, PALB2, PTEN, TP53) was negative. A copy of the report will be mailed to the home.

## 2017-09-11 NOTE — TELEPHONE ENCOUNTER
Spoke to the patient who called back to discuss her results. Ms. Vazquez told me that she just found out that her paternal aunt was diagnosed with breast cancer within the last decade (the aunt is now in her 60s). The patient wanted to know if this would change her results or what we recommend. We discussed the differences between familial and hereditary risk for cancer. I recommended that additional genetic testing should be considered in 1 year or so (especially if there are significant changes to the family history). A copy of the report will be mailed. Ms. Vazquez expressed understanding and denied questions.

## 2017-09-18 ENCOUNTER — PATIENT MESSAGE (OUTPATIENT)
Dept: SURGERY | Facility: CLINIC | Age: 47
End: 2017-09-18

## 2017-10-06 ENCOUNTER — TELEPHONE (OUTPATIENT)
Dept: SURGERY | Facility: CLINIC | Age: 47
End: 2017-10-06

## 2017-10-06 NOTE — TELEPHONE ENCOUNTER
Called patient to check in that she had her breast care completed somewhere else. Patient states that she did seek care at another facility, currently in [place] for work. She states she will call if she ever needs anything from us in the future.

## 2017-11-22 ENCOUNTER — TELEPHONE (OUTPATIENT)
Dept: OBSTETRICS AND GYNECOLOGY | Facility: CLINIC | Age: 47
End: 2017-11-22

## 2017-11-22 ENCOUNTER — OFFICE VISIT (OUTPATIENT)
Dept: OBSTETRICS AND GYNECOLOGY | Facility: CLINIC | Age: 47
End: 2017-11-22
Payer: MEDICAID

## 2017-11-22 ENCOUNTER — PROCEDURE VISIT (OUTPATIENT)
Dept: OBSTETRICS AND GYNECOLOGY | Facility: CLINIC | Age: 47
End: 2017-11-22
Payer: MEDICAID

## 2017-11-22 VITALS
BODY MASS INDEX: 21.47 KG/M2 | WEIGHT: 150 LBS | RESPIRATION RATE: 10 BRPM | SYSTOLIC BLOOD PRESSURE: 120 MMHG | HEART RATE: 78 BPM | HEIGHT: 70 IN | DIASTOLIC BLOOD PRESSURE: 80 MMHG

## 2017-11-22 DIAGNOSIS — N92.1 BREAKTHROUGH BLEEDING ON OCPS: Primary | ICD-10-CM

## 2017-11-22 DIAGNOSIS — N89.8 VAGINAL DISCHARGE: ICD-10-CM

## 2017-11-22 DIAGNOSIS — R10.2 PELVIC PAIN: ICD-10-CM

## 2017-11-22 DIAGNOSIS — D05.12 DUCTAL CARCINOMA IN SITU (DCIS) OF LEFT BREAST: ICD-10-CM

## 2017-11-22 PROCEDURE — 87660 TRICHOMONAS VAGIN DIR PROBE: CPT

## 2017-11-22 PROCEDURE — 76830 TRANSVAGINAL US NON-OB: CPT | Mod: PBBFAC | Performed by: OBSTETRICS & GYNECOLOGY

## 2017-11-22 PROCEDURE — 87480 CANDIDA DNA DIR PROBE: CPT

## 2017-11-22 PROCEDURE — 99213 OFFICE O/P EST LOW 20 MIN: CPT | Mod: S$PBB,25,, | Performed by: OBSTETRICS & GYNECOLOGY

## 2017-11-22 PROCEDURE — 99999 PR PBB SHADOW E&M-EST. PATIENT-LVL III: CPT | Mod: PBBFAC,,, | Performed by: OBSTETRICS & GYNECOLOGY

## 2017-11-22 PROCEDURE — 76830 TRANSVAGINAL US NON-OB: CPT | Mod: 26,S$PBB,, | Performed by: OBSTETRICS & GYNECOLOGY

## 2017-11-22 PROCEDURE — 99213 OFFICE O/P EST LOW 20 MIN: CPT | Mod: PBBFAC | Performed by: OBSTETRICS & GYNECOLOGY

## 2017-11-22 RX ORDER — NORGESTIMATE AND ETHINYL ESTRADIOL 0.25-0.035
1 KIT ORAL DAILY
Qty: 30 TABLET | Refills: 6 | Status: SHIPPED | OUTPATIENT
Start: 2017-11-22 | End: 2018-11-22

## 2017-11-22 NOTE — PROGRESS NOTES
Subjective:    Patient ID: Aman Vazquez is a 47 y.o. y.o. female.     Chief Complaint:   Chief Complaint   Patient presents with    Pelvic Pain       History of Present Illness:  Aman presents today complaining of breakthrough bleeding with OCPs; reports having brown vaginal discharge. She is also having RLQ pain and cramping. She has been on current OCP for ~ 8 months. This is the second time she has experienced the bleeding and pain. Reports she was told to monitor last time. The pain is much worse now. She has a recent diagnosis of left breast cancer s/p lumpectomy. Starting radiation soon.         ROS:   Review of Systems   Constitutional: Negative for activity change, appetite change, chills, diaphoresis, fatigue, fever and unexpected weight change.   HENT: Negative for mouth sores and tinnitus.    Eyes: Negative for discharge and visual disturbance.   Respiratory: Negative for cough, shortness of breath and wheezing.    Cardiovascular: Negative for chest pain, palpitations and leg swelling.   Gastrointestinal: Negative for abdominal pain, bloating, blood in stool, constipation, diarrhea, nausea and vomiting.   Endocrine: Negative for diabetes, hair loss, hot flashes, hyperthyroidism and hypothyroidism.   Genitourinary: Positive for menstrual problem, pelvic pain and dysmenorrhea. Negative for dysuria, flank pain, frequency, genital sores, hematuria, urgency, vaginal bleeding, vaginal discharge, vaginal pain, postcoital bleeding and vaginal odor.   Musculoskeletal: Negative for back pain, joint swelling and myalgias.   Skin:  Negative for rash and no acne.   Neurological: Negative for seizures, syncope, numbness and headaches.   Hematological: Negative for adenopathy. Does not bruise/bleed easily.   Psychiatric/Behavioral: Negative for depression and sleep disturbance. The patient is not nervous/anxious.    Breast: Negative for breast mass, breast pain, nipple discharge and skin  changes          Objective:    Vital Signs:  Vitals:    11/22/17 1216   BP: 120/80   Pulse: 78   Resp: 10       Physical Exam:  General:  alert, cooperative, no distress   Head Normocephalic, without obvious abnormality, atraumatic   Neck .supple, symmetrical, trachea midline   Skin:  Skin color, texture, turgor normal. No rashes or lesions   Abdomen:  soft, non-tender; bowel sounds normal   Pelvis: External genitalia: normal general appearance  Urinary system: urethral meatus normal, bladder nontender  Vaginal: normal mucosa without prolapse or lesions, discharge, brown  Cervix: normal appearance  Uterus: normal size, shape, position  Adnexa: normal size, nontender bilaterally   Extremities extremities normal, atraumatic, no cyanosis or edema   Neurologic Grossly normal            Assessment:      1. Breakthrough bleeding on OCPs    2. Ductal carcinoma in situ (DCIS) of left breast    3. Pelvic pain    4. Vaginal discharge          Plan:      PLAN:   1. Change OCPs; will discuss safety with [facility] center  2. Affirm  3. TVUS

## 2017-11-22 NOTE — TELEPHONE ENCOUNTER
----- Message from Thania Tejada MD sent at 11/22/2017  1:03 PM CST -----  Could you please reach out to the [facility] nurse? This patient has a history of recent breast cancer. She was told by her surgical oncologist it was safe to be on her OCPs but I need to double check that.     Erinn

## 2017-11-23 LAB
CANDIDA RRNA VAG QL PROBE: NEGATIVE
G VAGINALIS RRNA GENITAL QL PROBE: NEGATIVE
T VAGINALIS RRNA GENITAL QL PROBE: NEGATIVE

## 2017-11-24 NOTE — TELEPHONE ENCOUNTER
Patient calling to inquire if any information obtained regarding higher dose of OCP being safe. Patient instructed that Dr. Plasencia is out of clinic today, that message received from [facility] nurse, but that since she is not followed by [facility] that they recommend deferring message to oncology or physician following her. Patient states that she is followed by [facility]. Patient instructed that once Dr. Plasencia returns and answers message that she will be contacted. Patient verbalized understanding.

## 2017-11-29 NOTE — TELEPHONE ENCOUNTER
Per Dr. Plasencia according to pt pathology report, she is Estrogen/Progesterone receptor positive. would like to be sure ok to begin pt on OCP. Contacted Dr. May's office ([phone]) as instructed per Dr. Plasencia. Spoke with , Melissa. States she will have his nurse contact office at earliest convenience.

## 2017-11-29 NOTE — TELEPHONE ENCOUNTER
Dr. Plasencia states the only other option would be surgical management. Pt would need be seen for consult to discuss.     Pt notified, verbalized understanding. States she no lives in [place]. Currently in process of seeking ob/gyn. Requesting Dr. Plasencia make note of what is  recommended, this way she can discuss with new MD.

## 2017-11-29 NOTE — TELEPHONE ENCOUNTER
Jimbo with Dr. May's office states MD does not recommend pt takes OCP. Dr. Plasencia notified, agrees with his decision. Rx canceled at pharmacy. Pt notified, inquiring what are her alternative options to control symptoms. Please advise.

## 2017-11-30 NOTE — TELEPHONE ENCOUNTER
Spoke to patient. Discussed the risks of any hormonal contraception and contraindications with her history of breast cancer. Stressed the importance of her not being on OCPs. We discussed alterative treatment for her PMDD and patient would like to proceed with a SSRI. She has tried Lexapro in the past and is requesting to restart. Any other gynecologic symptoms may need to be treated surgically. She will call back with a pharmacy in [place] to send it to.

## 2018-01-16 ENCOUNTER — CHARTING TRANS (OUTPATIENT)
Dept: OTHER | Age: 48
End: 2018-01-16

## 2018-11-02 VITALS
RESPIRATION RATE: 18 BRPM | HEART RATE: 67 BPM | DIASTOLIC BLOOD PRESSURE: 80 MMHG | HEIGHT: 68 IN | TEMPERATURE: 98.3 F | SYSTOLIC BLOOD PRESSURE: 120 MMHG | OXYGEN SATURATION: 99 % | WEIGHT: 152 LBS | BODY MASS INDEX: 23.04 KG/M2

## 2020-11-02 ENCOUNTER — OFFICE VISIT (OUTPATIENT)
Dept: URBAN - METROPOLITAN AREA TELEHEALTH 2 | Facility: TELEHEALTH | Age: 50
End: 2020-11-02
Payer: COMMERCIAL

## 2020-11-02 DIAGNOSIS — K21.9 GERD (GASTROESOPHAGEAL REFLUX DISEASE): ICD-10-CM

## 2020-11-02 DIAGNOSIS — R14.0 BLOATING: ICD-10-CM

## 2020-11-02 PROBLEM — 235595009 GASTROESOPHAGEAL REFLUX DISEASE: Status: ACTIVE | Noted: 2020-11-02

## 2020-11-02 PROCEDURE — 99213 OFFICE O/P EST LOW 20 MIN: CPT | Performed by: INTERNAL MEDICINE

## 2020-11-02 RX ORDER — OMEPRAZOLE 40 MG/1
1 CAPSULE 30 MINUTES BEFORE MORNING MEAL CAPSULE, DELAYED RELEASE ORAL ONCE A DAY
Qty: 30 | Refills: 6 | OUTPATIENT
Start: 2020-11-02

## 2020-11-02 RX ORDER — LACTOBACIL 2/BIFIDO 1/S.THERMO 900B CELL
TWICE A DAY PACKET (EA) ORAL TWICE A DAY
Qty: 60 | Refills: 6 | OUTPATIENT
Start: 2020-11-02 | End: 2021-05-31

## 2020-11-02 RX ORDER — FLUTICASONE PROPIONATE 50 UG/1
SPRAY, METERED NASAL
Qty: 1 | Refills: 0 | Status: ACTIVE | COMMUNITY
Start: 1900-01-01

## 2020-11-02 NOTE — HPI-TODAY'S VISIT:
This is a 51 yo female with a long history of bloating, gas and GERD.  Prilosec OTC has imprved her symptoms.  She has more of bloating, fullness and indigestion which recurred agoin 3 months ago.  Recently it became unbearable.  VSL  has been moderately effective in the past.   Her last colonoscopy 2017 was unremarkable.  There is no family history of colon cancer or colon polyps.  She denies constipation and diarrhea.

## 2020-11-23 ENCOUNTER — OFFICE VISIT (OUTPATIENT)
Dept: URBAN - METROPOLITAN AREA TELEHEALTH 2 | Facility: TELEHEALTH | Age: 50
End: 2020-11-23

## 2020-12-11 ENCOUNTER — OFFICE VISIT (OUTPATIENT)
Dept: URBAN - METROPOLITAN AREA CLINIC 16 | Facility: CLINIC | Age: 50
End: 2020-12-11
Payer: COMMERCIAL

## 2020-12-11 DIAGNOSIS — R14.0 BLOATING: ICD-10-CM

## 2020-12-11 PROCEDURE — 91065 BREATH HYDROGEN/METHANE TEST: CPT | Performed by: INTERNAL MEDICINE

## 2020-12-14 ENCOUNTER — TELEPHONE ENCOUNTER (OUTPATIENT)
Dept: URBAN - METROPOLITAN AREA CLINIC 92 | Facility: CLINIC | Age: 50
End: 2020-12-14

## 2022-10-13 ENCOUNTER — WEB ENCOUNTER (OUTPATIENT)
Dept: URBAN - METROPOLITAN AREA CLINIC 105 | Facility: CLINIC | Age: 52
End: 2022-10-13

## 2022-10-13 ENCOUNTER — OFFICE VISIT (OUTPATIENT)
Dept: URBAN - METROPOLITAN AREA CLINIC 105 | Facility: CLINIC | Age: 52
End: 2022-10-13
Payer: COMMERCIAL

## 2022-10-13 VITALS
HEART RATE: 89 BPM | HEIGHT: 70 IN | BODY MASS INDEX: 21.05 KG/M2 | TEMPERATURE: 97.5 F | WEIGHT: 147 LBS | SYSTOLIC BLOOD PRESSURE: 113 MMHG | DIASTOLIC BLOOD PRESSURE: 68 MMHG

## 2022-10-13 DIAGNOSIS — Z12.11 SCREEN FOR COLON CANCER: ICD-10-CM

## 2022-10-13 DIAGNOSIS — K21.9 NONEROSIVE ESOPHAGEAL REFLUX DISEASE: ICD-10-CM

## 2022-10-13 PROBLEM — 235595009: Status: ACTIVE | Noted: 2022-10-13

## 2022-10-13 PROCEDURE — 99214 OFFICE O/P EST MOD 30 MIN: CPT | Performed by: INTERNAL MEDICINE

## 2022-10-13 RX ORDER — DEXLANSOPRAZOLE 60 MG/1
1 CAPSULE CAPSULE, DELAYED RELEASE ORAL ONCE A DAY
Qty: 90 | Refills: 1 | OUTPATIENT
Start: 2022-10-13

## 2022-10-13 RX ORDER — OMEPRAZOLE 40 MG/1
1 CAPSULE 30 MINUTES BEFORE MORNING MEAL CAPSULE, DELAYED RELEASE ORAL ONCE A DAY
Qty: 30 | Refills: 6 | Status: ACTIVE | COMMUNITY
Start: 2020-11-02

## 2022-10-13 RX ORDER — FLUTICASONE PROPIONATE 50 UG/1
SPRAY, METERED NASAL
Qty: 1 | Refills: 0 | Status: ON HOLD | COMMUNITY
Start: 1900-01-01

## 2022-10-13 RX ORDER — BUPROPION HYDROCHLORIDE 100 MG/1
1 TABLET TABLET, FILM COATED ORAL TWICE A DAY
Status: ACTIVE | COMMUNITY

## 2022-10-13 NOTE — HPI-TODAY'S VISIT:
10/13/22 53 yo lady with no pcp Seen DR Doran - seeing me urgently REflux started again 2 weeks ago. due to dietary non compliance.  Has Omeprazole 40 mg which she has taken daily "its not working".  She then recalls she had stopped Omeprazole 6 months ago. No help when she restarted 3 weeks ago. No vomiting. REflux is daily Wakes her up from sleep. No dysphagia No fhx of esophagus CA Reviewed EGD from 2015 and bx.  Pt says her last colonoscopy was in 2017 - no results in chart.

## 2022-10-18 ENCOUNTER — TELEPHONE ENCOUNTER (OUTPATIENT)
Dept: URBAN - METROPOLITAN AREA CLINIC 105 | Facility: CLINIC | Age: 52
End: 2022-10-18

## 2022-10-18 RX ORDER — PANTOPRAZOLE SODIUM 40 MG/1
1 TABLET TABLET, DELAYED RELEASE ORAL ONCE A DAY
Qty: 30 | OUTPATIENT
Start: 2022-10-19

## 2022-11-10 ENCOUNTER — OFFICE VISIT (OUTPATIENT)
Dept: URBAN - METROPOLITAN AREA CLINIC 92 | Facility: CLINIC | Age: 52
End: 2022-11-10

## 2022-11-10 RX ORDER — BUPROPION HYDROCHLORIDE 100 MG/1
1 TABLET TABLET, FILM COATED ORAL TWICE A DAY
COMMUNITY

## 2022-11-10 RX ORDER — DEXLANSOPRAZOLE 60 MG/1
1 CAPSULE CAPSULE, DELAYED RELEASE ORAL ONCE A DAY
Qty: 90 | Refills: 1 | COMMUNITY
Start: 2022-10-13

## 2022-11-10 RX ORDER — PANTOPRAZOLE SODIUM 40 MG/1
1 TABLET TABLET, DELAYED RELEASE ORAL ONCE A DAY
Qty: 30 | COMMUNITY
Start: 2022-10-19

## 2022-11-10 RX ORDER — FLUTICASONE PROPIONATE 50 UG/1
SPRAY, METERED NASAL
Qty: 1 | Refills: 0 | COMMUNITY
Start: 1900-01-01

## 2022-11-10 RX ORDER — OMEPRAZOLE 40 MG/1
1 CAPSULE 30 MINUTES BEFORE MORNING MEAL CAPSULE, DELAYED RELEASE ORAL ONCE A DAY
Qty: 30 | Refills: 6 | COMMUNITY
Start: 2020-11-02

## 2022-11-10 NOTE — HPI-OTHER HISTORIES
(Amanda Ortiz: 10/13/22) 53 yo lady with no pcp Seen DR Doran - seeing me urgently REflux started again 2 weeks ago. due to dietary non compliance.  Has Omeprazole 40 mg which she has taken daily "its not working".  She then recalls she had stopped Omeprazole 6 months ago. No help when she restarted 3 weeks ago. No vomiting. REflux is daily Wakes her up from sleep. No dysphagia No fhx of esophagus CA Reviewed EGD from 2015 and bx.  Pt says her last colonoscopy was in 2017 - no results in chart. This is a 51 yo female with a long history of bloating, gas and GERD.  Prilosec OTC has imprved her symptoms.  She has more of bloating, fullness and indigestion which recurred agoin 3 months ago.  Recently it became unbearable.  VSL  has been moderately effective in the past.   Her last colonoscopy 2017 was unremarkable.  There is no family history of colon cancer or colon polyps.  She denies constipation and diarrhea.

## 2022-11-29 ENCOUNTER — TELEPHONE ENCOUNTER (OUTPATIENT)
Dept: URBAN - METROPOLITAN AREA CLINIC 105 | Facility: CLINIC | Age: 52
End: 2022-11-29

## 2022-11-29 RX ORDER — PANTOPRAZOLE SODIUM 40 MG/1
1 TABLET TABLET, DELAYED RELEASE ORAL ONCE A DAY
Qty: 30 | Refills: 3
Start: 2022-10-19

## 2023-02-06 ENCOUNTER — TELEPHONE ENCOUNTER (OUTPATIENT)
Dept: URBAN - METROPOLITAN AREA CLINIC 105 | Facility: CLINIC | Age: 53
End: 2023-02-06

## 2023-02-06 RX ORDER — PANTOPRAZOLE SODIUM 40 MG/1
1 TABLET TABLET, DELAYED RELEASE ORAL ONCE A DAY
Qty: 30 | Refills: 0
Start: 2022-10-19

## 2023-02-21 ENCOUNTER — OFFICE VISIT (OUTPATIENT)
Dept: URBAN - METROPOLITAN AREA CLINIC 17 | Facility: CLINIC | Age: 53
End: 2023-02-21
Payer: COMMERCIAL

## 2023-02-21 ENCOUNTER — DASHBOARD ENCOUNTERS (OUTPATIENT)
Age: 53
End: 2023-02-21

## 2023-02-21 ENCOUNTER — WEB ENCOUNTER (OUTPATIENT)
Dept: URBAN - METROPOLITAN AREA CLINIC 17 | Facility: CLINIC | Age: 53
End: 2023-02-21

## 2023-02-21 VITALS
HEIGHT: 70 IN | SYSTOLIC BLOOD PRESSURE: 125 MMHG | WEIGHT: 148 LBS | HEART RATE: 78 BPM | TEMPERATURE: 98.4 F | DIASTOLIC BLOOD PRESSURE: 76 MMHG | BODY MASS INDEX: 21.19 KG/M2

## 2023-02-21 DIAGNOSIS — Z85.3 PERSONAL HISTORY OF BREAST CANCER: ICD-10-CM

## 2023-02-21 DIAGNOSIS — K21.9 GERD WITHOUT ESOPHAGITIS: ICD-10-CM

## 2023-02-21 DIAGNOSIS — R63.4 WEIGHT LOSS: ICD-10-CM

## 2023-02-21 DIAGNOSIS — R10.13 EPIGASTRIC PAIN: ICD-10-CM

## 2023-02-21 DIAGNOSIS — Z12.11 COLON CANCER SCREENING: ICD-10-CM

## 2023-02-21 PROBLEM — 415076002 PERSONAL HISTORY OF PRIMARY MALIGNANT NEOPLASM OF BREAST: Status: ACTIVE | Noted: 2023-02-21

## 2023-02-21 PROCEDURE — 99204 OFFICE O/P NEW MOD 45 MIN: CPT | Performed by: INTERNAL MEDICINE

## 2023-02-21 RX ORDER — BUPROPION HYDROCHLORIDE 100 MG/1
1 TABLET TABLET, FILM COATED ORAL TWICE A DAY
Status: ACTIVE | COMMUNITY

## 2023-02-21 RX ORDER — POLYETHYLENE GLYCOL 3350, SODIUM CHLORIDE, SODIUM BICARBONATE, POTASSIUM CHLORIDE 420; 11.2; 5.72; 1.48 G/4L; G/4L; G/4L; G/4L
AS DIRECTED POWDER, FOR SOLUTION ORAL ONCE
Qty: 4000 | Refills: 0 | OUTPATIENT
Start: 2023-02-21 | End: 2023-02-22

## 2023-02-21 RX ORDER — PANTOPRAZOLE SODIUM 40 MG/1
1 TABLET TABLET, DELAYED RELEASE ORAL ONCE A DAY
Qty: 30 | Refills: 0 | Status: ACTIVE | COMMUNITY
Start: 2022-10-19

## 2023-02-21 NOTE — HPI-TODAY'S VISIT:
This is a 51 yo female with a PMHx of Breast cancer here for exacerbation of GERD.  The patient had a 3 week stretch of abdominal pain which is still present.  She admits to unintentional weight loss of 17 pounds since she started Wellbutrin April 2022.  GERD symptoms well controlled with pantoprazole prescribed by Dr. Ortiz a few months ago.

## 2023-02-21 NOTE — PHYSICAL EXAM RECTAL:
normal tone, no external hemorrhoids, no masses palpable, no red blood, Tenderness on NEYMAR, Internal hemorrhoids present

## 2023-02-28 ENCOUNTER — LAB OUTSIDE AN ENCOUNTER (OUTPATIENT)
Dept: URBAN - METROPOLITAN AREA CLINIC 17 | Facility: CLINIC | Age: 53
End: 2023-02-28

## 2023-03-16 PROBLEM — 266435005: Status: ACTIVE | Noted: 2023-03-16

## 2023-03-17 ENCOUNTER — TELEPHONE ENCOUNTER (OUTPATIENT)
Dept: URBAN - METROPOLITAN AREA CLINIC 92 | Facility: CLINIC | Age: 53
End: 2023-03-17

## 2023-03-17 RX ORDER — PANTOPRAZOLE SODIUM 40 MG/1
1 TABLET TABLET, DELAYED RELEASE ORAL ONCE A DAY
Qty: 30 | Refills: 11
Start: 2022-10-19

## 2023-04-05 ENCOUNTER — WEB ENCOUNTER (OUTPATIENT)
Dept: URBAN - METROPOLITAN AREA SURGERY CENTER 30 | Facility: SURGERY CENTER | Age: 53
End: 2023-04-05

## 2023-04-07 ENCOUNTER — CLAIMS CREATED FROM THE CLAIM WINDOW (OUTPATIENT)
Dept: URBAN - METROPOLITAN AREA CLINIC 4 | Facility: CLINIC | Age: 53
End: 2023-04-07
Payer: COMMERCIAL

## 2023-04-07 ENCOUNTER — OFFICE VISIT (OUTPATIENT)
Dept: URBAN - METROPOLITAN AREA SURGERY CENTER 30 | Facility: SURGERY CENTER | Age: 53
End: 2023-04-07
Payer: COMMERCIAL

## 2023-04-07 DIAGNOSIS — Z12.11 COLON CANCER SCREENING: ICD-10-CM

## 2023-04-07 DIAGNOSIS — K31.89 ACQUIRED DEFORMITY OF DUODENUM: ICD-10-CM

## 2023-04-07 DIAGNOSIS — R10.13 ABDOMINAL DISCOMFORT, EPIGASTRIC: ICD-10-CM

## 2023-04-07 DIAGNOSIS — K31.89 OTHER DISEASES OF STOMACH AND DUODENUM: ICD-10-CM

## 2023-04-07 DIAGNOSIS — K63.5 BENIGN COLON POLYP: ICD-10-CM

## 2023-04-07 PROCEDURE — 88342 IMHCHEM/IMCYTCHM 1ST ANTB: CPT | Performed by: PATHOLOGY

## 2023-04-07 PROCEDURE — G8907 PT DOC NO EVENTS ON DISCHARG: HCPCS | Performed by: INTERNAL MEDICINE

## 2023-04-07 PROCEDURE — 88305 TISSUE EXAM BY PATHOLOGIST: CPT | Performed by: PATHOLOGY

## 2023-04-07 PROCEDURE — 43239 EGD BIOPSY SINGLE/MULTIPLE: CPT | Performed by: INTERNAL MEDICINE

## 2023-04-07 PROCEDURE — 45380 COLONOSCOPY AND BIOPSY: CPT | Performed by: INTERNAL MEDICINE

## 2023-04-07 PROCEDURE — 88312 SPECIAL STAINS GROUP 1: CPT | Performed by: PATHOLOGY

## 2023-05-19 ENCOUNTER — OFFICE VISIT (OUTPATIENT)
Dept: URBAN - METROPOLITAN AREA SURGERY CENTER 30 | Facility: SURGERY CENTER | Age: 53
End: 2023-05-19